# Patient Record
Sex: FEMALE | Race: WHITE | Employment: PART TIME | URBAN - METROPOLITAN AREA
[De-identification: names, ages, dates, MRNs, and addresses within clinical notes are randomized per-mention and may not be internally consistent; named-entity substitution may affect disease eponyms.]

---

## 2017-01-03 DIAGNOSIS — K64.4 EXTERNAL HEMORRHOIDS: ICD-10-CM

## 2017-01-03 LAB — PHENYTOIN SERPL-MCNC: 12.4 MG/L (ref 10–20)

## 2017-01-03 PROCEDURE — 80185 ASSAY OF PHENYTOIN TOTAL: CPT | Performed by: NURSE PRACTITIONER

## 2017-01-03 PROCEDURE — 36415 COLL VENOUS BLD VENIPUNCTURE: CPT | Performed by: NURSE PRACTITIONER

## 2017-01-05 DIAGNOSIS — R56.9 CONVULSIONS, UNSPECIFIED CONVULSION TYPE (H): Primary | Chronic | ICD-10-CM

## 2017-01-05 RX ORDER — PHENYTOIN SODIUM 100 MG/1
CAPSULE, EXTENDED RELEASE ORAL
Qty: 60 CAPSULE | Refills: 0 | Status: SHIPPED | OUTPATIENT
Start: 2017-01-05 | End: 2017-01-19

## 2017-01-05 NOTE — PROGRESS NOTES
Quick Note:    Dear Kiley,  Phenytoin level is down to 12.4. Increase Phenytoin dosage to 130 mg in morning and 100 mg at dinner. Repeat phenytoin level on Monday. New Rx for 100 mg tablets and 30 mg tablets sent to pharmacy.  Please contact our clinic via phone or My Chart if you have any questions or concerns.  Thanks,  JENNIFER Thompson      ______

## 2017-01-18 DIAGNOSIS — K64.4 EXTERNAL HEMORRHOIDS: ICD-10-CM

## 2017-01-18 PROCEDURE — 36415 COLL VENOUS BLD VENIPUNCTURE: CPT | Performed by: NURSE PRACTITIONER

## 2017-01-18 PROCEDURE — 80185 ASSAY OF PHENYTOIN TOTAL: CPT | Performed by: NURSE PRACTITIONER

## 2017-01-19 DIAGNOSIS — R56.9 CONVULSIONS, UNSPECIFIED CONVULSION TYPE (H): Primary | Chronic | ICD-10-CM

## 2017-01-19 LAB — PHENYTOIN SERPL-MCNC: 14.7 MG/L (ref 10–20)

## 2017-01-19 RX ORDER — PHENYTOIN SODIUM 100 MG/1
CAPSULE, EXTENDED RELEASE ORAL
Qty: 60 CAPSULE | Refills: 0 | Status: SHIPPED | OUTPATIENT
Start: 2017-01-19

## 2017-01-19 NOTE — PROGRESS NOTES
Quick Note:    Phenytoin level has come up a little. I will titrate up a little as you have not been coming in for daily phenytoin levels. Re-started Dilantin 100 mg BID... Then we added 30 mg so she was taking 130 mg at breakfast and 100 mg dinner. Will increase now to 160 mg breakfast and 100 mg dinner. She needs to come in for phenytoin levels more regularly- not 2 weeks. Further refills will be by DR. Figueroa.    I don't see an appointment scheduled for your hemorrhoids. Please schedule.    Please send her a copy of lab/test results.   Thanks. Shyanne Escudero, JENNIFER      ______

## 2017-02-27 ENCOUNTER — TRANSFERRED RECORDS (OUTPATIENT)
Dept: HEALTH INFORMATION MANAGEMENT | Facility: CLINIC | Age: 47
End: 2017-02-27

## 2017-02-27 DIAGNOSIS — G40.802 VISUAL EPILEPSY (H): Primary | ICD-10-CM

## 2017-02-27 LAB — PHENYTOIN SERPL-MCNC: 26.6 MG/L (ref 10–20)

## 2017-02-27 PROCEDURE — 80185 ASSAY OF PHENYTOIN TOTAL: CPT | Performed by: PSYCHIATRY & NEUROLOGY

## 2017-02-27 PROCEDURE — 36415 COLL VENOUS BLD VENIPUNCTURE: CPT | Performed by: PSYCHIATRY & NEUROLOGY

## 2017-02-27 PROCEDURE — 80171 DRUG SCREEN QUANT GABAPENTIN: CPT | Mod: 90 | Performed by: PSYCHIATRY & NEUROLOGY

## 2017-02-27 PROCEDURE — 80186 ASSAY OF PHENYTOIN FREE: CPT | Mod: 90 | Performed by: PSYCHIATRY & NEUROLOGY

## 2017-03-01 LAB
GABAPENTIN SERPLBLD-MCNC: 10.8 UG/ML
PHENYTOIN FREE SERPL-MCNC: 1.7 UG/ML

## 2017-03-16 DIAGNOSIS — Z89.112: ICD-10-CM

## 2017-03-16 RX ORDER — GABAPENTIN 600 MG/1
TABLET ORAL
Qty: 180 TABLET | Refills: 3 | COMMUNITY
Start: 2017-03-16

## 2017-03-16 RX ORDER — GABAPENTIN 600 MG/1
TABLET ORAL
Qty: 180 TABLET | Refills: 3 | Status: SHIPPED | OUTPATIENT
Start: 2017-03-16 | End: 2017-03-16

## 2017-03-16 NOTE — TELEPHONE ENCOUNTER
Spoke with [facility] pharm, states insurance will only cover 1 month supply, likely this was request was sent by default.- disregard.  JEAN-CLAUDE Mccormack RN

## 2017-03-16 NOTE — TELEPHONE ENCOUNTER
I originally filled then discontinued the RX  Because per OV with her Neurologist Dr. Figueroa 2/27/2017 he noted he refilled Gabapentin for a year.   I called [facility] in [place] and they don't have a RX from Dr. Figueroa for Neurontin, but they do have a dated Rx for Dilantin from the 2/27/2017 OV that was electronically sent. The pharmacy tech noted that the patient had already been to the pharmacy and picked up my RX. This is fine.    JENNIFER Thompson

## 2017-03-16 NOTE — TELEPHONE ENCOUNTER
gabapentin (NEURONTIN) 600 MG tablet      Last Written Prescription Date: 12/16/16  Last Quantity: 180, # refills: 3  Last Office Visit with G, [facility] or [facility] prescribing provider: 12/23/16       Creatinine   Date Value Ref Range Status   12/22/2016 0.63 0.52 - 1.04 mg/dL Final     Lab Results   Component Value Date    AST 29 12/22/2016     Lab Results   Component Value Date    ALT 24 12/22/2016     BP Readings from Last 3 Encounters:   12/23/16 138/74   04/01/16 138/86   03/23/16 132/60

## 2017-09-11 DIAGNOSIS — S68.412A: ICD-10-CM

## 2017-09-11 NOTE — TELEPHONE ENCOUNTER
gabapentin (NEURONTIN) 600 MG tablet      Last Written Prescription Date: 3/46/17  Last Quantity: 180, # refills: 3  Last Office Visit with G, [facility] or [facility] prescribing provider: 12/23/16       Creatinine   Date Value Ref Range Status   12/22/2016 0.63 0.52 - 1.04 mg/dL Final     Lab Results   Component Value Date    AST 29 12/22/2016     Lab Results   Component Value Date    ALT 24 12/22/2016     BP Readings from Last 3 Encounters:   12/23/16 138/74   04/01/16 138/86   03/23/16 132/60

## 2017-09-12 RX ORDER — GABAPENTIN 600 MG/1
TABLET ORAL
Qty: 180 TABLET | Refills: 3 | Status: SHIPPED | OUTPATIENT
Start: 2017-09-12